# Patient Record
(demographics unavailable — no encounter records)

---

## 2017-02-21 NOTE — MM
Reason for exam: screening  (asymptomatic).

Last mammogram was performed 1 year and 4 months ago.



History:

Family history of premenopausal breast cancer in maternal aunt at age 34.



Physical Findings:

A clinical breast exam by your physician is recommended on an annual basis and 

results should be correlated with mammographic findings.



MG Screening Mammo w CAD

Bilateral CC, MLO, and XCCL view(s) were taken.

Prior study comparison: October 19, 2015, bilateral MG screening mammo w CAD.  

December 9, 2013, bilateral digital screening mammo w/CAD.  May 29, 2012, 

bilateral digital screening mammo w/CAD.

There are scattered fibroglandular densities.  No significant changes when 

compared with prior studies.





ASSESSMENT: Negative, BI-RAD 1



RECOMMENDATION:

Routine screening mammogram of both breasts in 1 year.

## 2017-03-23 NOTE — XR
EXAMINATION TYPE: XR chest 2V

 

DATE OF EXAM: 3/22/2017 10:22 AM

 

COMPARISON: NONE

 

HISTORY: Shortness of breath and chest pain

 

TECHNIQUE:  Frontal and lateral views of the chest are obtained.

 

FINDINGS:  Patient is rotated. Accentuation of the heart is likely due to technique. Increased AP deepak
meter chest may be indicative of underlying COPD. There is no evident pneumonia, pneumothorax, or ple
ural effusion. Pulmonary vascularity and tonja are within normal.

 

IMPRESSION:  No acute cardiopulmonary process.

## 2017-04-18 NOTE — CT
EXAMINATION TYPE: CT angio chest

 

DATE OF EXAM: 4/18/2017 8:33 PM

 

COMPARISON: NONE

 

HISTORY: Increased SOB x2 weeks.

 

CT DLP: 890.4 mGycm

Automated exposure control for dose reduction was used.

 

CONTRAST: 

CTA scan of the thorax is performed with IV Contrast, patient injected with 90 mL of Omnipaque 350, p
ulmonary embolism protocol.  There are 3-D post processed images..  

 

FINDINGS:

 

Exam is limited by patient size. There is mild focal consolidation and atelectasis in the right upper
 lobe posterior segment along the major fissure. There is no sign of aortic aneurysm or dissection. T
here is suboptimal contrast density in the lower lobe pulmonary arteries. I'm suspicious for multiple
 filling defects in the lower lobe pulmonary arteries. There is no mediastinal adenopathy. There are 
no hilar masses. The bony thorax is intact.

 

IMPRESSION: 

CARDIOMEGALY.

 

LIMITED EXAM. WITHIN THE LIMITS OF THE EXAM THERE IS EVIDENCE FOR MULTIPLE SMALL EMBOLI IN THE LOWER 
LOBE PULMONARY ARTERIES AND PROBABLY ALSO THE RIGHT UPPER LOBE PULMONARY ARTERY. THERE IS INFILTRATE 
IN THE POSTERIOR SEGMENT RIGHT UPPER LOBE THAT COULD BE A PULMONARY INFARCT.

 

FINDINGS WERE GIVEN VERBALLY TO DR. MCINTYRE, 8:40 PM.

## 2017-04-18 NOTE — XR
EXAMINATION TYPE: XR chest 2V

 

DATE OF EXAM: 4/18/2017 7:13 PM

 

COMPARISON: 3/22/2017

 

HISTORY: Difficulty breathing

 

TECHNIQUE:  Frontal and lateral views of the chest are obtained.

 

FINDINGS:  The heart and mediastinum are normal. Lungs are clear. Costophrenic angles are clear. Ther
e are no hilar masses. There are chest leads.

 

IMPRESSION:  No active cardiopulmonary disease. No change.

## 2017-04-18 NOTE — ED
General Adult HPI





- General


Chief complaint: Shortness of Breath


Stated complaint: Difficulty Breathing


Time Seen by Provider: 17 17:19


Source: patient, RN notes reviewed, old records reviewed


Mode of arrival: wheelchair


Limitations: no limitations





- History of Present Illness


Initial comments: 





This is a 45-year-old female here for evaluation of shortness of breath severe 

shortness of breath and exertional dyspnea.  Patient has a history of morbid 

obesity.  No travel history no squeal specific chest pain just exertional 

shortness of breath.  Patient states just by standing up she gets significantly 

short of breath.  This is been an issue on and off really getting progressively 

worse for a few weeks now.  Patient has had low pulse ox and testing at her 

family doctor's office and has been going through outpatient treatment with 

both cardiology and her family doctor to figure out cause of shortness of 

breath.  Patient denies fever cough or congestion





- Related Data


 Home Medications











 Medication  Instructions  Recorded  Confirmed


 


Atenolol/Chlorthalidone 1 tab PO DAILY 06/19/15 04/18/17





[Atenolol-Chlorthalidone 50-25]   


 


ALPRAZolam [Xanax] 0.5 mg PO BID PRN 17


 


Ergocalciferol [Vitamin D2] 50,000 unit PO TU 17


 


Fish Oil/Dha/Epa [Fish Oil 1,200 2 cap PO DAILY 17





mg Fish Oil]   


 


Levothyroxine Sodium [Synthroid] 75 mcg PO HS 17


 


Lisinopril [Zestril] 5 mg PO DAILY 17


 


Niacin (Inositol Niacinate) 1,200 mg PO DAILY 17





[Niacin Flush Free 500 mg Cap]   


 


glipiZIDE [Glucotrol XL] 10 mg PO BID 17











 Allergies











Allergy/AdvReac Type Severity Reaction Status Date / Time


 


Statins-Hmg-Coa Reductase AdvReac Severe Muscle Verified 17 18:34





Inhibitor   Cramps  














Review of Systems


ROS Statement: 


Those systems with pertinent positive or pertinent negative responses have been 

documented in the HPI.





ROS Other: All systems not noted in ROS Statement are negative.





Past Medical History


Past Medical History: Diabetes Mellitus, Hypertension, Thyroid Disorder


History of Any Multi-Drug Resistant Organisms: None Reported


Past Surgical History:  Section


Additional Past Surgical History / Comment(s): right ovary removed


Past Psychological History: Depression


Smoking Status: Current every day smoker


Past Alcohol Use History: None Reported


Past Drug Use History: None Reported





General Exam


Limitations: no limitations


General appearance: alert, in distress, obese


Head exam: Present: atraumatic, normocephalic, normal inspection


Eye exam: Present: normal appearance, PERRL, EOMI.  Absent: scleral icterus, 

conjunctival injection, periorbital swelling


ENT exam: Present: normal exam, mucous membranes moist


Neck exam: Present: normal inspection.  Absent: tenderness, meningismus, 

lymphadenopathy


Respiratory exam: Present: normal lung sounds bilaterally, decreased breath 

sounds, prolonged expiratory.  Absent: respiratory distress, wheezes, rales, 

rhonchi, stridor


Cardiovascular Exam: Present: regular rate, normal rhythm, normal heart sounds.

  Absent: systolic murmur, diastolic murmur, rubs, gallop, clicks


GI/Abdominal exam: Present: soft, normal bowel sounds.  Absent: distended, 

tenderness, guarding, rebound, rigid


Extremities exam: Present: normal inspection, full ROM, normal capillary 

refill.  Absent: tenderness, pedal edema, joint swelling, calf tenderness


Back exam: Present: normal inspection


Neurological exam: Present: alert, oriented X3, CN II-XII intact


Psychiatric exam: Present: normal affect, normal mood


Skin exam: Present: warm, dry, intact, normal color.  Absent: rash





Course


 Vital Signs











  17





  17:14 17:16 17:42


 


Temperature 97.5 F L  


 


Pulse Rate 80 82 


 


Respiratory 22 22 22





Rate   


 


Blood Pressure 111/60 108/60 


 


O2 Sat by Pulse 87 L 100 





Oximetry   














  17





  18:16 18:18 18:26


 


Temperature   


 


Pulse Rate 74 85 88


 


Respiratory 20  





Rate   


 


Blood Pressure 109/69  


 


O2 Sat by Pulse 92 L  





Oximetry   














- Reevaluation(s)


Reevaluation #1: 





17 20:44


Patient remains with significant exertional shortness of breath


Reevaluation #2: 





17 20:44


Spoke with radiology regarding patient





Medical Decision Making





- Medical Decision Making





45 female in the ER for reevaluation of chest pain source of breath exertional 

dyspnea.  Hypoxia.  Patient does have CT positive for PE.  Will be admitted





- Lab Data


Result diagrams: 


 17 18:45





 17 18:45


 Lab Results











  17 Range/Units





  18:45 18:45 18:45 


 


WBC  11.5 H    (3.8-10.6)  k/uL


 


RBC  5.16    (3.80-5.40)  m/uL


 


Hgb  16.0    (11.4-16.0)  gm/dL


 


Hct  51.4 H    (34.0-46.0)  %


 


MCV  99.6    (80.0-100.0)  fL


 


MCH  31.1    (25.0-35.0)  pg


 


MCHC  31.2    (31.0-37.0)  g/dL


 


RDW  15.2    (11.5-15.5)  %


 


Plt Count  222    (150-450)  k/uL


 


Neutrophils %  70    %


 


Lymphocytes %  23    %


 


Monocytes %  3    %


 


Eosinophils %  2    %


 


Basophils %  1    %


 


Neutrophils #  8.0 H    (1.3-7.7)  k/uL


 


Lymphocytes #  2.7    (1.0-4.8)  k/uL


 


Monocytes #  0.4    (0-1.0)  k/uL


 


Eosinophils #  0.2    (0-0.7)  k/uL


 


Basophils #  0.1    (0-0.2)  k/uL


 


Hypochromasia  Slight    


 


Macrocytosis  Slight    


 


PT   11.8   (9.0-12.0)  sec


 


INR   1.2   (<1.1)  


 


APTT   18.5 L   (22.0-30.0)  sec


 


D-Dimer   3.08 H   (<0.60)  mg/L FEU


 


Sodium    136 L  (137-145)  mmol/L


 


Potassium    4.7  (3.5-5.1)  mmol/L


 


Chloride    94 L  ()  mmol/L


 


Carbon Dioxide    31 H  (22-30)  mmol/L


 


Anion Gap    11  mmol/L


 


BUN    20 H  (7-17)  mg/dL


 


Creatinine    0.91  (0.52-1.04)  mg/dL


 


Est GFR (MDRD) Af Amer    >60  (>60 ml/min/1.73 sqM)  


 


Est GFR (MDRD) Non-Af    >60  (>60 ml/min/1.73 sqM)  


 


Glucose    132 H  (74-99)  mg/dL


 


Calcium    9.0  (8.4-10.2)  mg/dL


 


Magnesium    2.0  (1.6-2.3)  mg/dL


 


Total Bilirubin    1.0  (0.2-1.3)  mg/dL


 


AST    43 H  (14-36)  U/L


 


ALT    42  (9-52)  U/L


 


Alkaline Phosphatase    48  ()  U/L


 


Total Creatine Kinase     ()  U/L


 


CK-MB (CK-2)     (0.0-2.4)  ng/mL


 


CK-MB (CK-2) Rel Index     


 


Troponin I     (0.000-0.034)  ng/mL


 


NT-Pro-B Natriuret Pep     pg/mL


 


Total Protein    7.3  (6.3-8.2)  g/dL


 


Albumin    3.7  (3.5-5.0)  g/dL














  17 Range/Units





  18:45 18:45 


 


WBC    (3.8-10.6)  k/uL


 


RBC    (3.80-5.40)  m/uL


 


Hgb    (11.4-16.0)  gm/dL


 


Hct    (34.0-46.0)  %


 


MCV    (80.0-100.0)  fL


 


MCH    (25.0-35.0)  pg


 


MCHC    (31.0-37.0)  g/dL


 


RDW    (11.5-15.5)  %


 


Plt Count    (150-450)  k/uL


 


Neutrophils %    %


 


Lymphocytes %    %


 


Monocytes %    %


 


Eosinophils %    %


 


Basophils %    %


 


Neutrophils #    (1.3-7.7)  k/uL


 


Lymphocytes #    (1.0-4.8)  k/uL


 


Monocytes #    (0-1.0)  k/uL


 


Eosinophils #    (0-0.7)  k/uL


 


Basophils #    (0-0.2)  k/uL


 


Hypochromasia    


 


Macrocytosis    


 


PT    (9.0-12.0)  sec


 


INR    (<1.1)  


 


APTT    (22.0-30.0)  sec


 


D-Dimer    (<0.60)  mg/L FEU


 


Sodium    (137-145)  mmol/L


 


Potassium    (3.5-5.1)  mmol/L


 


Chloride    ()  mmol/L


 


Carbon Dioxide    (22-30)  mmol/L


 


Anion Gap    mmol/L


 


BUN    (7-17)  mg/dL


 


Creatinine    (0.52-1.04)  mg/dL


 


Est GFR (MDRD) Af Amer    (>60 ml/min/1.73 sqM)  


 


Est GFR (MDRD) Non-Af    (>60 ml/min/1.73 sqM)  


 


Glucose    (74-99)  mg/dL


 


Calcium    (8.4-10.2)  mg/dL


 


Magnesium    (1.6-2.3)  mg/dL


 


Total Bilirubin    (0.2-1.3)  mg/dL


 


AST    (14-36)  U/L


 


ALT    (9-52)  U/L


 


Alkaline Phosphatase    ()  U/L


 


Total Creatine Kinase  49   ()  U/L


 


CK-MB (CK-2)  0.8   (0.0-2.4)  ng/mL


 


CK-MB (CK-2) Rel Index  1.6   


 


Troponin I  0.102 H*   (0.000-0.034)  ng/mL


 


NT-Pro-B Natriuret Pep   4010  pg/mL


 


Total Protein    (6.3-8.2)  g/dL


 


Albumin    (3.5-5.0)  g/dL














- Radiology Data


Radiology results: report reviewed (Chest x-ray negative CT positive for PE), 

image reviewed





Critical Care Time


Critical Care Time: Yes


Total Critical Care Time: 31





Disposition


Clinical Impression: 


 Pulmonary embolism, Hypoxia





Disposition: ADMITTED AS IP TO THIS [facility]


Condition: Serious


Referrals: 


Que Mcdermott DO [Primary Care Provider] - 1-2 days

## 2017-04-19 NOTE — P.CNPUL
History of Present Illness


Consult date: 17


Reason for consult: dyspnea, hypoxemia, pulmonary embolism, abnormal CXR/CT


Chief complaint: Shortness of breath


History of present illness: 





45-year-old very obese female with history of hypertension and diabetes who 

presents with about 3 weeks with a progressive and increasing shortness of 

breath.  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  Anyway the breathing got so bad that she ended up coming into the 

emergency room to be evaluated and was found to have small pulmonary emboli in 

the right lower lobe pulmonary artery.  Possibly also some small emboli in the 

right upper lobe pulmonary artery.  Anyway, she was admitted and placed on IV 

heparin.  She's feeling a bit better today.  The patient does smoke cigarettes.

  She does have a history of hypertension and diabetes.  Her symptoms have been 

progressive.  We talked a little bit about pulmonary embolism today.  I told 

because her clot was probably non-provoked even though she is a bit sedentary, 

she should probably be treated for 6 months.





Review of Systems





A 12 point review of system is positive for progressive and severe shortness of 

breath.  Other than that she really doesn't have much in the way of complaints.





Past Medical History


Past Medical History: Diabetes Mellitus, Hypertension, Pulmonary Embolus (PE), 

Thyroid Disorder


History of Any Multi-Drug Resistant Organisms: None Reported


Past Surgical History:  Section


Additional Past Surgical History / Comment(s): right ovary removed


Past Anesthesia/Blood Transfusion Reactions: No Reported Reaction


Past Psychological History: Depression


Smoking Status: Current every day smoker


Past Alcohol Use History: None Reported


Past Drug Use History: None Reported





- Past Family History


  ** Father


Family Medical History: No Reported History





  ** Mother


Family Medical History: No Reported History





Medications and Allergies


 Home Medications











 Medication  Instructions  Recorded  Confirmed  Type


 


Atenolol/Chlorthalidone 1 tab PO DAILY 06/19/15 04/19/17 History





[Atenolol-Chlorthalidone 50-25]    


 


ALPRAZolam [Xanax] 0.5 mg PO BID PRN 17 History


 


Ergocalciferol [Vitamin D2] 50,000 unit PO TU 17 History


 


Fish Oil/Dha/Epa [Fish Oil 1,200 2 cap PO DAILY 17 History





mg Fish Oil]    


 


Levothyroxine Sodium [Synthroid] 75 mcg PO HS 17 History


 


Lisinopril [Zestril] 5 mg PO DAILY 17 History


 


Niacin (Inositol Niacinate) 1,200 mg PO DAILY 17 History





[Niacin Flush Free 500 mg Cap]    


 


glipiZIDE [Glucotrol XL] 10 mg PO BID 17 History











 Allergies











Allergy/AdvReac Type Severity Reaction Status Date / Time


 


Statins-Hmg-Coa Reductase AdvReac Severe Muscle Verified 17 18:34





Inhibitor   Cramps  














Physical Exam


Osteopathic Statement: *.  No significant issues noted on an osteopathic 

structural exam other than those noted in the History and Physical/Consult.


Vitals: 


 Vital Signs











  Temp Pulse Pulse Resp BP BP Pulse Ox


 


 17 08:00  96.8 F L   60  18   116/68  94 L


 


 17 04:00  97.3 F L   77  19   104/52  95


 


 17 00:34  98.8 F  93   24  127/62   92 L


 


 17 23:00   98   24    89 L


 


 17 21:52  98.4 F  90   22  121/71   90 L


 


 17 20:50   88   22  128/63   89 L








 Intake and Output











 17





 06:59 14:59 22:59


 


Intake Total 1138.396 373.003 


 


Balance 1138.396 373.003 


 


Intake:   


 


    


 


    Heparin Sodium,Porcine/ 450  





    D5w Pmx 25,000 unit In   





    Dextrose/Water 1 500ml.   





    bag @ 10.5 UNITS/KG/HR 45   





    .91 mls/hr IV .[id number]   





    LAKESHA Rx#:[id number]   


 


    Sodium Chloride 0.9% 1, 200  





    000 ml @ 20 mls/hr IV .   





    Q24H STA Rx#:[id number]   


 


  Intake, IV Titration 488.396 373.003 





  Amount   


 


    Heparin Sodium,Porcine/ 488.396 373.003 





    D5w Pmx 25,000 unit In   





    Dextrose/Water 1 500ml.   





    bag @ 10.5 UNITS/KG/HR 45   





    .91 mls/hr IV .[id number]   





    LAKESHA Rx#:[id number]   


 


Other:   


 


  Voiding Method Bedside Commode  


 


  # Voids 1 0 


 


  # Bowel Movements 1 0 


 


  Weight 218.9 kg  














No acute distress, oriented 3.  Sitting at the bedside.  Very obese female.  

Difficult to examine because of body habitus.





HEENT examination is grossly unremarkable.





Neck supple.  Full range of motion.  No adenopathy.





Cardiovascular examination reveals distant heart sounds.  S1-S2 normal.





Lungs reveal mostly clear breath sounds.  No wheezes rhonchi or crackles.





Abdomen obese.  Bowel sounds are heard.





Extremities are intact.  Mild edema.





Skin without rash or lesion.











Results





- Laboratory Findings


CBC and BMP: 


 17 06:52





 17 18:45


PT/INR, D-dimer











PT  11.8 sec (9.0-12.0)   17  18:45    


 


INR  1.2  (<1.1)   17  18:45    


 


D-Dimer  3.08 mg/L FEU (<0.60)  H  17  18:45    








Abnormal lab findings: 


 Abnormal Labs











  17





  01:17 02:50 06:07


 


WBC   


 


Hct   


 


Neutrophils # (Manual)   


 


APTT   37.3 H 


 


POC Glucose (mg/dL)    234 H


 


Hemoglobin A1c   


 


Troponin I  0.063 H*  


 


Triglycerides   


 


LDL Cholesterol, Calc   


 


HDL Cholesterol   














  17





  06:52 06:52 06:52


 


WBC  12.2 H  


 


Hct  48.3 H  


 


Neutrophils # (Manual)  9.5 H  


 


APTT   


 


POC Glucose (mg/dL)   


 


Hemoglobin A1c   


 


Troponin I   0.062 H* 


 


Triglycerides    177 H


 


LDL Cholesterol, Calc    118 H


 


HDL Cholesterol    19 L














  17





  06:52 08:58 11:58


 


WBC   


 


Hct   


 


Neutrophils # (Manual)   


 


APTT   64.2 H 


 


POC Glucose (mg/dL)    154 H


 


Hemoglobin A1c  7.5 H  


 


Troponin I   


 


Triglycerides   


 


LDL Cholesterol, Calc   


 


HDL Cholesterol   














- Diagnostic Findings


Chest x-ray: image reviewed


CT scan - chest: image reviewed (Chest x-rays labs and medications are all 

reviewed.)





Assessment and Plan


(1) COPD (chronic obstructive pulmonary disease)


Status: Acute   





(2) Diabetes mellitus


Status: Acute   





(3) Hypertension


Status: Acute   





(4) Hypoxia


Status: Acute   





(5) Obesity


Status: Acute   





(6) Pulmonary embolism


Status: Acute   


Plan: 





Plan dated 2017





The patient should be treated for 6 months with blood thinners.  Hopefully she'

ll qualify for one of the factor X a inhibitors.  Currently she is on IV 

heparin.  She needs a repeat computed tomography scan in about 8-10 weeks.  I 

asked her to come see me in the office.  No we'll clear-cut definable risk 

factor other than the fact that she is somewhat sedentary although she 

continues to work.  The patient is a smoker though.  Does not take any estrogen 

replacement therapy.  Has not been on any long car train boat or plane rides.  

No trauma to the lower extremities.  Doppler of the lower semis were negative.  

In the office, she'll have complete PFTs.  Again we'll repeat a computed 

tomography scan in about 8-10 weeks.


Time with Patient: Greater than 30

## 2017-04-19 NOTE — US
EXAMINATION TYPE: US venous doppler duplex LE 

 

DATE OF EXAM: 4/19/2017 7:54 AM

 

COMPARISON: NONE

 

CLINICAL History: pain, swelling, possible PE.

 

SIDE PERFORMED: Bilateral  

 

TECHNIQUE:  The lower extremity deep venous system is examined utilizing real time linear array sonog
aleyda with graded compression, doppler sonography and color-flow sonography.

 

VESSELS IMAGED:

External Iliac Vein (EIV)

Common Femoral Vein

Deep Femoral Vein

Greater Saphenous Vein *

Femoral Vein-unable to visualized mid and distal for compression views

Popliteal Vein

Proximal Calf Veins-not seen

(* superficial vessels)

 

Patient 482lbs, exam technically difficult and somewhat limited

 

Right Leg:  Appears negative for DVT in this somewhat limited study

 

Left Leg:   Appears negative for DVT in this somewhat limited study

 

 

 

IMPRESSION: No evident deep venous thrombosis bilaterally within the deep veins as described, limited
 exam

## 2017-04-19 NOTE — P.CRDCN
History of Present Illness


Consult date: 17


History of present illness: 


This is a 45-year-old female with history of diabetes and obesity who was seen 

by Dr. Liao for evaluation of symptoms of shortness of breath about 2 weeks 

ago.  Patient had an echocardiogram which showed normal LV function at the time 

and also normal right ventricular size.  Over the last 3-4 days, patient has 

been experiencing increasing shortness of breath and orthopnea.  Patient came 

to the emergency room with those symptoms.  Chest x-ray did not reveal any 

findings of CHF.  Blood test showed evidence of elevated d-dimer.  Computed 

tomography scan showed evidence of multiple pulmonary emboli.  Her troponins 

are also mildly elevated.  BNP is also elevated.  EKG showed sinus rhythm with 

a diffuse ST-T wave normalities in anterolateral leads suggestive of ischemia.  

Patient is currently on heparin.  Patient's venous duplex study of the lower 

legs was negative for any DVT.  Patient does have some rash and cellulitis-like 

changes in the left upper arm.  We are going to get a venous duplex study of 

the arm to rule out DVT in the left upper arm.  We will going to repeat the 

echocardiogram to rule out any wall motion abnormalities because of abnormal 

EKG and troponin values.  We may empirically treat her with nitrates and beta 

blockers along with aspirin.  Further recommendations depend upon clinical 

course.








Review of Systems





REVIEW OF SYSTEMS:


CONSTITUTIONAL:.  Patient is doing well. No complaints of fever or chills


EYES: Denies diplopia, blurring of vision


EARS, NOSE, MOUTH, THROAT: Denies headaches, denies sore throat.


CARDIOVASCULAR: As per HPI


RESPIRATORY: As per HPI


GASTROINTESTINAL: Denies change in appetite, denies abdominal pain, denies 

diarrhea


GENITOURINARY: Denies hematuria, denies infections.


MUSKULOSKELETAL: Denies pain, denies swelling.  Denies any cramps or 

claudication


INTEGUMENTARY: Denies rash, denies eczema.


NEUROLOGICAL:  Denies  focal weakness, or visual disturbance.  Denies any 

dizziness or syncope


PSYCHIATRIC: Denies anxiety, denies depression.


HEMATOLOGIC/LYMPHATIC: Denies any bleeding, denies enlarged lymph nodes.








Past Medical History


Past Medical History: Diabetes Mellitus, Hypertension, Pulmonary Embolus (PE), 

Thyroid Disorder


History of Any Multi-Drug Resistant Organisms: None Reported


Past Surgical History:  Section


Additional Past Surgical History / Comment(s): right ovary removed


Past Anesthesia/Blood Transfusion Reactions: No Reported Reaction


Past Psychological History: Depression


Smoking Status: Current every day smoker


Past Alcohol Use History: None Reported


Past Drug Use History: None Reported





- Past Family History


  ** Father


Family Medical History: No Reported History





  ** Mother


Family Medical History: No Reported History





Medications and Allergies


 Home Medications











 Medication  Instructions  Recorded  Confirmed  Type


 


Atenolol/Chlorthalidone 1 tab PO DAILY 06/19/15 04/19/17 History





[Atenolol-Chlorthalidone 50-25]    


 


ALPRAZolam [Xanax] 0.5 mg PO BID PRN 17 History


 


Ergocalciferol [Vitamin D2] 50,000 unit PO TU 17 History


 


Fish Oil/Dha/Epa [Fish Oil 1,200 2 cap PO DAILY 17 History





mg Fish Oil]    


 


Levothyroxine Sodium [Synthroid] 75 mcg PO HS 17 History


 


Lisinopril [Zestril] 5 mg PO DAILY 17 History


 


Niacin (Inositol Niacinate) 1,200 mg PO DAILY 17 History





[Niacin Flush Free 500 mg Cap]    


 


glipiZIDE [Glucotrol XL] 10 mg PO BID 17 History











 Allergies











Allergy/AdvReac Type Severity Reaction Status Date / Time


 


Statins-Hmg-Coa Reductase AdvReac Severe Muscle Verified 17 18:34





Inhibitor   Cramps  














Physical Exam


Vitals: 


 Vital Signs











  Temp Pulse Pulse Resp BP BP Pulse Ox


 


 17 08:00  96.8 F L   60  18   116/68  94 L


 


 17 04:00  97.3 F L   77  19   104/52  95


 


 17 00:34  98.8 F  93   24  127/62   92 L


 


 17 23:00   98   24    89 L


 


 17 21:52  98.4 F  90   22  121/71   90 L


 


 17 20:50   88   22  128/63   89 L








 Intake and Output











 17





 22:59 06:59 14:59


 


Intake Total  1138.396 


 


Balance  1138.396 


 


Intake:   


 


  IV  650 


 


    Heparin Sodium,Porcine/  450 





    D5w Pmx 25,000 unit In   





    Dextrose/Water 1 500ml.   





    bag @ 10.5 UNITS/KG/HR 45   





    .91 mls/hr IV .[id number]   





    [facility] Rx#:[id number]   


 


    Sodium Chloride 0.9% 1,  200 





    000 ml @ 20 mls/hr IV .   





    Q24H [facility] Rx#:[id number]   


 


  Intake, IV Titration  488.396 





  Amount   


 


    Heparin Sodium,Porcine/  488.396 





    D5w Pmx 25,000 unit In   





    Dextrose/Water 1 500ml.   





    bag @ 10.5 UNITS/KG/HR 45   





    .91 mls/hr IV .[id number]   





    [facility] Rx#:[id number]   


 


Other:   


 


  Voiding Method  Bedside Commode 


 


  # Voids  1 0


 


  # Bowel Movements  1 0


 


  Weight  218.9 kg 














GENERAL EXAM: Patient is alert and oriented and appears to be in moderate 

distress.


HEENT: Normocephalic. Normal reaction of pupils, equal size, normal range of 

extraocular motion. No erythema or exudates in the throat.


NECK: No masses, no nuchal rigidity.


CHEST: No chest wall deformity.


LUNGS: Diminished air entry with scattered rhonchi


HEART: Distant heart sounds


ABDOMEN: No hepatosplenomegaly, normal bowel sounds, no guarding or rigidity.


SKIN: No rashes


CENTRAL NERVOUS SYSTEM: No focal deficits.


EXTREMITIES: No cyanosis, clubbing or edema.  There is a rash over the left 

shoulder area.





Results





 17 06:52





 17 18:45


 Cardiac Enzymes











  17 Range/Units





  01:17 06:52 


 


CK-MB (CK-2)  0.6  0.8  (0.0-2.4)  ng/mL


 


Troponin I  0.063 H*  0.062 H*  (0.000-0.034)  ng/mL








 Coagulation











  17 Range/Units





  02:50 08:58 


 


APTT  37.3 H  64.2 H  (22.0-30.0)  sec








 Lipids











  17 Range/Units





  06:52 


 


Triglycerides  177 H  (<150)  mg/dL


 


Cholesterol  172  (<200)  mg/dL


 


HDL Cholesterol  19 L  (40-60)  mg/dL








 CBC











  17 Range/Units





  06:52 


 


WBC  12.2 H  (3.8-10.6)  k/uL


 


RBC  4.86  (3.80-5.40)  m/uL


 


Hgb  15.4  (11.4-16.0)  gm/dL


 


Hct  48.3 H  (34.0-46.0)  %


 


Plt Count  243  (150-450)  k/uL








 Current Medications











Generic Name Dose Route Start Last Admin





  Trade Name Freq  PRN Reason Stop Dose Admin


 


Albuterol/Ipratropium  3 ml  17 12:00  





  Duoneb 0.5 Mg-3 Mg/3 Ml Soln  INHALATION   





  RT-QID [facility]   


 


Albuterol/Ipratropium  3 ml  17 10:49  





  Duoneb 0.5 Mg-3 Mg/3 Ml Soln  INHALATION   





  RT-Q2H PRN   





  Shortness Of Breath Or Wheezing   


 


Alprazolam  0.5 mg  17 10:48  





  Xanax  PO   





  BID PRN   





  Anxiety   


 


Aspirin  325 mg  17 09:00  





  Aspirin  PO   





  DAILY [facility]   


 


Atenolol  50 mg  17 11:00  





  Tenormin  PO   





  DAILY [facility]   


 


Chlorthalidone  25 mg  17 11:00  





  Hygroton  PO   





  DAILY [facility]   


 


Ergocalciferol  50,000 unit  17 12:00  





  Vitamin D2  PO   





  TU [facility]   


 


Glipizide  10 mg  17 11:30  





  Glucotrol  PO   





  BID [facility]   


 


Heparin Sodium (Porcine)  0 unit  17 20:40  17 04:29





  Heparin  IV   10,000 unit





  PER PROTOCOL PRN   Administration





  Low PTT   





  Protocol   


 


Sodium Chloride  1,000 mls @ 20 mls/hr  17 18:02  17 18:26





  Saline 0.9%  IV  17 18:01  20 mls/hr





  .Q24H [facility]   Administration


 


Heparin Sodium/Dextrose 25,000  500 mls @ 45.91 mls/hr  17 20:45   06:41





  unit/ IV Solution  IV   14.5 units/kg/hr





  .[id number] [facility]   63.4 mls/hr





  Protocol   Administration





  10.5 UNITS/KG/HR   


 


Insulin Human Lispro  0 unit  17 07:30  17 06:57





  Humalog  SQ   8 unit





  [facility]   Administration





  Protocol   


 


Levothyroxine Sodium  75 mcg  17 21:00  





  Synthroid  PO   





  HS [facility]   


 


Lisinopril  5 mg  17 11:30  





  Zestril  PO   





  DAILY [facility]   


 


Miscellaneous Information  1 each  17 18:54  





  Rx Info: Iv Contrast Was Given  MISCELLANE  17 18:54  





  DAILY PRN   





  Per Protocol   


 


Niacin  1,000 mg  17 11:00  





  Niacin Tr  PO   





  DAILY [facility]   


 


Nicotine  1 patch  17 11:30  





  Habitrol 21mg/24hr Patch  TRANSDERM   





  DAILY [facility]   


 


Nitroglycerin  0.4 mg  17 20:40  





  Nitrostat  SUBLINGUAL   





  Q5M PRN   





  Chest Pain   


 


Pantoprazole Sodium  40 mg  17 11:30  





  Protonix  IVP   





  DAILY [facility]   








 Intake and Output











 17





 22:59 06:59 14:59


 


Intake Total  1138.396 


 


Balance  1138.396 


 


Intake:   


 


  IV  650 


 


    Heparin Sodium,Porcine/  450 





    D5w Pmx 25,000 unit In   





    Dextrose/Water 1 500ml.   





    bag @ 10.5 UNITS/KG/HR 45   





    .91 mls/hr IV .[id number]   





    [facility] Rx#:[id number]   


 


    Sodium Chloride 0.9% 1,  200 





    000 ml @ 20 mls/hr IV .   





    Q24H [facility] Rx#:[id number]   


 


  Intake, IV Titration  488.396 





  Amount   


 


    Heparin Sodium,Porcine/  488.396 





    D5w Pmx 25,000 unit In   





    Dextrose/Water 1 500ml.   





    bag @ 10.5 UNITS/KG/HR 45   





    .91 mls/hr IV .[id number]   





    [facility] Rx#:[id number]   


 


Other:   


 


  Voiding Method  Bedside Commode 


 


  # Voids  1 0


 


  # Bowel Movements  1 0


 


  Weight  218.9 kg 








 





 17 06:52 











EKG Interpretations (text)





Sinus rhythm with ST-T changes in anterolateral leads suggestive of ischemia.  

Could be related to pulmonary emboli





Assessment and Plan


(1) Abnormal EKG


Status: Acute   





(2) Hypoxia


Status: Acute   





(3) Pulmonary embolism


Status: Acute   





(4) Diabetes mellitus


Status: Acute   





(5) Obesity


Status: Acute   





(6) Hypertension


Status: Acute   


Plan: 


Continue with heparin.  Repeat echocardiogram to assess for wall motion 

abnormality and ultrasound of the left arm to rule out deep venous thrombosis.  

Continue with nitrates and diuretics and also ACE inhibitor along with beta 

blocker.  If echo cardiogram shows any segmental wall motion defects, patient 

may need cardiac catheterization.

## 2017-04-19 NOTE — US
EXAMINATION TYPE: US venous doppler duplex UE LT

 

DATE OF EXAM: 4/19/2017 3:46 PM

 

COMPARISON: NONE

 

CLINICAL HISTORY: Redness.

 

SIDE PERFORMED: left

 

482lb patient, exam technically difficult and somewhat limited.

 

 

 

Left Arm: Appears negative for DVT in this somewhat limited exam.

 

Grayscale, color Doppler, spectral Doppler imaging performed of the deep veins of the upper extremity
. The left internal jugular vein is patent, there is normal color flow and compressibility, normal va
scular waveforms, visualized portions of the subclavian vein unremarkable, axillary vein shows color 
flow. The basilic vein shows compressibility and color flow analysis is cephalic vein. Brachial veins
 are patent. Radial veins are patent. Ulnar veins are patent.

 

IMPRESSION: 

No deep venous thrombosis is evident within the limitations of the exam.

## 2017-04-19 NOTE — HP
DATE OF ADMISSION:  2017



DATE OF SERVICE: 2017 



CHIEF COMPLAINT: Shortness of breath. 



HISTORY OF PRESENT ILLNESS: This 45-year-old woman with a past medical 

history of diabetes mellitus, hypertension, history of pulmonary 

embolism, history of hypothyroidism, history of depression, history of 

nicotine dependence, being followed by Dr. Mcdermott in the outpatient 

setting, was complaining of shortness of breath for the past several 

weeks. Because of lack of improvement, the patient came to [facility] and was admitted for further evaluation and treatment. The 

shortness of breath was progressing, but on  it got worse 

suddenly, according to her. There is no history of chest pain, no 

palpitation. Patient came to [facility] Emergency Room. 

D-dimer was elevated. Patient  had a CT scan of the chest, which I 

reviewed personally. It was reported as showing evidence of multiple 

small emboli in the lower lobe, pulmonary arteries and probably in the 

right upper lobe, also. Some infiltrate was suspected, and patient was 

admitted for further evaluation and treatment. EKG showed ST-T changes 

and cardiology evaluation is in progress. Troponins are indeterminate, 

up to 0.062. There is no history of any fever, rigor, or chills.  No 

history of any headache, loss of consciousness, seizures at this time. 

 



PAST MEDICAL HISTORY: 

1. History of diabetes mellitus. 

2. Hypertension. 

3. History of hypothyroidism. 

4.  section. 

5. History of depression. 



HOME MEDICATIONS: 

1. Glucotrol XL 10 mg p.o. b.i.d. 

2. Niacin 1200 mg p.o. daily. 

3. Zestril 5 mg p.o. daily. 

4. Synthroid 75 mcg p.o. daily. 

5. Fish oil 2 capsules daily. 

6. Vitamin D2 50,000 p.o. Tuesday. 

7. Atenolol/chlorthalidone 50/25 one p.o. daily. 

8. Xanax 0.5 p.o. b.i.d. p.r.n. 



ALLERGIES: STATINS. 



FAMILY HISTORY: No history of any heart disease or strokes in the 

family.   



SOCIAL HISTORY: History of smoking on a daily basis. No history of 

alcohol intake.  



REVIEW OF SYSTEMS: 

ENT: No diminished hearing.  No diminished vision. 

CARDIOVASCULAR SYSTEM: As mentioned earlier. 

RESPIRATORY SYSTEM: As mentioned earlier. 

GI: No nausea. 

: No dysuria. 

NERVOUS SYSTEM: As mentioned earlier. 

ALLERGY/IMMUNOLOGY: No asthma, hayfever. 

MUSCULOSKELETAL: As mentioned earlier. 

HEMATOLOGY/ONCOLOGY: No history of anemia. 

ENDOCRINE: Diabetes mellitus and hypothyroidism. 

CONSTITUTIONAL:  As mentioned earlier. 

DERMATOLOGY: Negative. 

RHEUMATOLOGY: Negative. 

PSYCHIATRY: As mentioned earlier. 



PHYSICAL EXAMINATION: Patient is alert and oriented x3. Pulse 60, 

blood pressure 116/68, respiration 18, temperature 96.8, pulse ox 94% 

on 4 L.  

HEENT: Conjunctivae normal. Oral mucosa moist. 

NECK: Obese. No jugular venous distention. No carotid bruit. No lymph 

node enlargement. 

CARDIOVASCULAR SYSTEM: S1, S2 muffled. No S3. No S4.  

RESPIRATORY SYSTEM: Breath sounds diminished at the bases. A few 

scattered rhonchi heard. No crackles. No (      ) present.  

ABDOMEN: Soft, obese, nontender. No mass palpable. 

LEGS: Minimal edema bilaterally. 

NERVOUS SYSTEM: Higher functions as mentioned earlier. Moves all 4 

limbs. No focal motor or sensory deficit.  

LYMPHATICS:  No lymph node palpable in neck, axillae or groin.   

SKIN: As mentioned earlier. 

JOINTS: No active deforming arthropathy. 



LABS: WBC 12.2, hemoglobin 15.4. APTT 64.2. Troponin 0.062. 

Triglycerides 177. HDL is 19.  



ASSESSMENT: 

1. Shortness of breath with acute bilateral pulmonary embolism. 

2. Troponin 0.062, indeterminate. Rule out coronary artery disease or 

secondary to pulmonary embolism.  

3. Increased white count, possibly reactive. 

4. Increased hematocrit. 

5. History of nicotine dependence. 

6. D-dimer 3.08. 

7. Hyponatremia, mild. 

8. Diabetes mellitus, type 2. 

9. Hypothyroidism. 

10. Increased AST. 

11. Super morbid obesity; body mass index of 75.6. 

12. Hypertension, essential. 

13. History of hypothyroidism. 

14. History of  section. 

15. Right oophorectomy. 

16. Depression not otherwise specified. 

17. History of nicotine dependence, continued, ongoing. 

18. FULL CODE. 



RECOMMENDATIONS AND DISCUSSION: In this 45-year-old woman who 

presented with multiple complex medical issues, we will monitor the 

patient closely, continue the current medications, continue the 

symptomatic treatment. IV heparin. Full coagulation workup. Dr. Levy 

will be consulted for evaluation of the pulmonary status. Otherwise, 

Cardiology also consulted for abnormal EKG and indeterminate 

troponins. A 2-D echo with Doppler will been ordered. Monitor blood 

sugars closely. Continue with the rest of the medications. Symptomatic 

treatment. Resume the home medications. A copy of this dictation is 

being forwarded to Dr. Mcdermott, who is the primary physician.

## 2017-04-20 NOTE — P.PN
<Cassidy Ness - Last Filed: 04/20/17 16:54>





Subjective





Date of service 04/20/2017.





Personal being dictated for Dr. Corey.





Interval history: This a 45-year-old female admitted with acute COPD 

exacerbation, pulmonary embolism and multiple other medical issues.  Maintained 

on heparin drip.  Case management verifying patient will have outpatient 

prescription coverage for Xarelto.  Telemetry sinus rhythm to sinus tach, low 

100s.  Borderline hypotension. Denies chest pain, palpitations, or increased 

shortness of breath.





Objective





- Vital Signs


Vital signs: 


 Vital Signs











Temp  97.8 F   04/20/17 15:45


 


Pulse  76   04/20/17 15:45


 


Resp  18   04/20/17 16:00


 


BP  92/54   04/20/17 15:45


 


Pulse Ox  91 L  04/20/17 15:45








 Intake & Output











 04/19/17 04/20/17 04/20/17





 18:59 06:59 18:59


 


Intake Total 9125.282 3816 609.893


 


Output Total  350 


 


Balance 2085.709 1480 609.893


 


Weight  220.1 kg 220.1 kg


 


Intake:   


 


  IV  1040 


 


    Heparin Sodium,Porcine/  720 





    D5w Pmx 25,000 unit In   





    Dextrose/Water 1 500ml.   





    bag @ 10.5 UNITS/KG/HR 45   





    .91 mls/hr IV .[id number]   





    [facility] Rx#:[id number]   


 


    Sodium Chloride 0.9% 1,  320 





    000 ml @ 20 mls/hr IV .   





    Q24H [facility] Rx#:[id number]   


 


  Intake, IV Titration 1060.000 500 609.893





  Amount   


 


    Heparin Sodium,Porcine/ 500.000 500 609.893





    D5w Pmx 25,000 unit In   





    Dextrose/Water 1 500ml.   





    bag @ 10.5 UNITS/KG/HR 45   





    .91 mls/hr IV .[id number]   





    [facility] Rx#:[id number]   


 


    Heparin Sodium,Porcine/ 400  





    D5w Pmx 25,000 unit In   





    Dextrose/Water 1 500ml.   





    bag @ 12 UNITS/KG/HR 52.   





    47 mls/hr IV .Q9H32M [facility]   





    Rx#:[id number]   


 


    Sodium Chloride 0.9% 1, 160  





    000 ml @ 20 mls/hr IV .   





    Q24H [facility] Rx#:[id number]   


 


Output:   


 


  Urine  350 


 


Other:   


 


  Voiding Method  Bedside Commode Bedside Commode


 


  # Voids 0  1


 


  # Bowel Movements 0  














- Exam


PHYSICAL EXAM:


VITAL SIGNS: As above


GENERAL: [Sitting up at side of bed, no acute distress]


HEENT: [Pupils equal conjunctiva normal.  Oral mucosa moist]


NECK: [Supple, no JVD]


RESPIRATORY EFFORT:[Normal]


LUNGS:  [Essentially clear, bilateral bases diminished, no wheezes rhonchi or 

crackles]


CARDIOVASCULAR[regular S1 and S2, no murmur rub or gallop, positive edema]


GI: [Abdomen soft, obese, nontender, positive bowel sounds.]


PSYCH: [Alert and oriented -3, mood and affect normal.]


NEURO: No focal deficits, moves all 4 extremities, strength and sensation 

grossly intact











- Labs


CBC & Chem 7: 


 04/20/17 05:42





 04/20/17 05:42


Labs: 


 Abnormal Lab Results - Last 24 Hours (Table)











  04/19/17 04/19/17 04/20/17 Range/Units





  16:59 20:58 05:42 


 


APTT    74.0 H  (22.0-30.0)  sec


 


Sodium     (137-145)  mmol/L


 


BUN     (7-17)  mg/dL


 


Glucose     (74-99)  mg/dL


 


POC Glucose (mg/dL)  128 H  146 H   (75-99)  mg/dL














  04/20/17 04/20/17 04/20/17 Range/Units





  05:42 05:50 11:56 


 


APTT     (22.0-30.0)  sec


 


Sodium  136 L    (137-145)  mmol/L


 


BUN  20 H    (7-17)  mg/dL


 


Glucose  167 H    (74-99)  mg/dL


 


POC Glucose (mg/dL)   164 H  241 H  (75-99)  mg/dL














Assessment and Plan


Plan: 


1. [Shortness of breath with acute bilateral pulmonary embolism].


2. [Acute hypoxic respiratory failure secondary to the above].


3. [Troponin 0.062, indeterminate, rule out CAD ,possibly related to PE].


4. [Leukocytosis, possibly reactive].


5. [Ongoing nicotine dependence].


6.  Acute COPD exacerbation


7. [Diabetes mellitus type 2].


8.  Hypothyroidism


9.  Elevated AST


10.  Morbid obesity, BMI 76


11.  Essential hypertension


12.  Depression, not otherwise specified


13.  Heparin monitoring





Plan: Continue on current medication regime ,monitoring and symptomatic 

treatment.  Maintain heparin drip overnight.  Case management verifying Xarelto 

RX coverage for discharge.





<Alex Corey - Last Filed: 04/20/17 17:03>





Subjective


Principal diagnosis: 


Acute hypoxic repsiratory failure








Objective





- Vital Signs


Vital signs: 


 Vital Signs











Temp  97.8 F   04/20/17 15:45


 


Pulse  82   04/20/17 16:34


 


Resp  18   04/20/17 16:00


 


BP  92/54   04/20/17 15:45


 


Pulse Ox  91 L  04/20/17 15:45








 Intake & Output











 04/19/17 04/20/17 04/20/17





 18:59 06:59 18:59


 


Intake Total 4866.222 5803 609.893


 


Output Total  350 


 


Balance 4975.633 4157 609.893


 


Weight  220.1 kg 220.1 kg


 


Intake:   


 


  IV  1040 


 


    Heparin Sodium,Porcine/  720 





    D5w Pmx 25,000 unit In   





    Dextrose/Water 1 500ml.   





    bag @ 10.5 UNITS/KG/HR 45   





    .91 mls/hr IV .[id number]   





    [facility] Rx#:[id number]   


 


    Sodium Chloride 0.9% 1,  320 





    000 ml @ 20 mls/hr IV .   





    Q24H [facility] Rx#:[id number]   


 


  Intake, IV Titration 1060.000 500 609.893





  Amount   


 


    Heparin Sodium,Porcine/ 500.000 500 609.893





    D5w Pmx 25,000 unit In   





    Dextrose/Water 1 500ml.   





    bag @ 10.5 UNITS/KG/HR 45   





    .91 mls/hr IV .[id number]   





    [facility] Rx#:[id number]   


 


    Heparin Sodium,Porcine/ 400  





    D5w Pmx 25,000 unit In   





    Dextrose/Water 1 500ml.   





    bag @ 12 UNITS/KG/HR 52.   





    47 mls/hr IV .Q9H32M [facility]   





    Rx#:[id number]   


 


    Sodium Chloride 0.9% 1, 160  





    000 ml @ 20 mls/hr IV .   





    Q24[facility] Rx#:[id number]   


 


Output:   


 


  Urine  350 


 


Other:   


 


  Voiding Method  Bedside Commode Bedside Commode


 


  # Voids 0  1


 


  # Bowel Movements 0  














- Labs


CBC & Chem 7: 


 04/20/17 05:42





 04/20/17 05:42


Labs: 


 Abnormal Lab Results - Last 24 Hours (Table)











  04/19/17 04/19/17 04/20/17 Range/Units





  16:59 20:58 05:42 


 


APTT    74.0 H  (22.0-30.0)  sec


 


Sodium     (137-145)  mmol/L


 


BUN     (7-17)  mg/dL


 


Glucose     (74-99)  mg/dL


 


POC Glucose (mg/dL)  128 H  146 H   (75-99)  mg/dL














  04/20/17 04/20/17 04/20/17 Range/Units





  05:42 05:50 11:56 


 


APTT     (22.0-30.0)  sec


 


Sodium  136 L    (137-145)  mmol/L


 


BUN  20 H    (7-17)  mg/dL


 


Glucose  167 H    (74-99)  mg/dL


 


POC Glucose (mg/dL)   164 H  241 H  (75-99)  mg/dL














  04/20/17 Range/Units





  16:41 


 


APTT   (22.0-30.0)  sec


 


Sodium   (137-145)  mmol/L


 


BUN   (7-17)  mg/dL


 


Glucose   (74-99)  mg/dL


 


POC Glucose (mg/dL)  123 H  (75-99)  mg/dL

## 2017-04-20 NOTE — CDI
In responding to this query, please exercise your independent professional 
judgment. [facility] Coding Staff and Clinical Documentation Specialists 

appreciate your assistance in clarifying documentation, maintaining compliance 
with coding guidelines, accurately documenting patients condition and 
capturing severity of illness. The fact that a question is asked does not imply 
that any particular answer is desired or expected. Communication forms are a 
method of clarifying documentation and are not made part of the [facility] 
Record. Thank you in advance for your clarification.

 Last Revision, March 2016



Carie [place]

[address] [place] [address]



Documentation Clarification Form



Date: 4/20/2017 1:33:00 PM

From: Simone Upton RN, BSN, CDI

Phone: [phone]

MRN: [id number]

Admit Date: 4/18/2017 8:40:00 PM

Patient Name: Cristela Peña 

Visit Number: [id number]



Dr. Alex Corey:



The patient presented with the following respiratory symptoms: progressive SOB 
and exertional dyspnea.



"Hypoxia" is documented in the ED report, in the cardiology and pulmonary 
consultant notes.

"Shortness of breath" is documented in the H&P



History/Risk Factors:  44 yo female with a history of COPD and super morbid 
obesity, BMI: 76.0, currently being treated for acute b/l PE.

Tobacco use: "current every day smoker"

Home oxygen: none



Clinical Indicators: 

Initial Vital signs/Pulse oximetry: 111/60, 80, 22, 97.5, 87% RA

Lung/Breathing assessment:  "diminished @ bases, few scattered rhonchi, severe 
shortness of breath"

Treatment: Duonebs, Heparin bolus/gtt

O2:  Oxygen 2L-15L NRB (4/19)

Spo2 readings: 89-92% 4L, RR: 22-24, 92% 15L NRB

4/20: current Spo2: 91% 4L



In your professional opinion, can you please clarify if these findings signify 
one of the following conditions?  



   Acuity:

        o   Acute

        o   Chronic

        o   Acute on Chronic



   Respiratory Status:

        o   Respiratory failure

        o   Respiratory failure with hypoxia

        o   Acute Respiratory Distress

        o   Other Diagnosis, please specify

        o   Unable to determine



Please document in your progress notes and discharge summary in order to 
capture severity of illness and risk of mortality. Include clinical findings 
that support your diagnosis.



 FYI: Press F11 to launch patient chart.



_____ Place X here if this finding has no clinical significance, is not 
applicable or if you are not able to provide any additional documentation.

MTDD

## 2017-04-20 NOTE — P.PN
Subjective


Principal diagnosis: 





PE





This is a 45-year-old female with history of diabetes and obesity who was seen 

by Dr. Liao for evaluation of symptoms of shortness of breath about 2 weeks 

ago.  Patient had an echocardiogram which showed normal LV function at the time 

and also normal right ventricular size.  Over the last 3-4 days, patient has 

been experiencing increasing shortness of breath and orthopnea.  Patient came 

to the emergency room with those symptoms.  Chest x-ray did not reveal any 

findings of CHF.  Blood test showed evidence of elevated d-dimer.  Computed 

tomography scan showed evidence of multiple pulmonary emboli.  Her troponins 

are also mildly elevated.  BNP is also elevated.  EKG showed sinus rhythm with 

a diffuse ST-T wave normalities in anterolateral leads suggestive of ischemia.  

Echocardiogram with Doppler study revealed normal left ventricular systolic 

function.  Venous duplex study negative for DVT.  Blood pressure 92/50 with a 

heart rate in the 60s.   continues to be on IV heparin.  We will check to 

see if patient has coverage for one of the newer anticoagulants, if so we will 

initiate this.  Overall the patient is feeling much better today.





Objective





- Vital Signs


Vital signs: 


 Vital Signs











Temp  97.8 F   04/20/17 15:45


 


Pulse  76   04/20/17 15:45


 


Resp  18   04/20/17 15:45


 


BP  92/54   04/20/17 15:45


 


Pulse Ox  91 L  04/20/17 15:45








 Intake & Output











 04/19/17 04/20/17 04/20/17





 18:59 06:59 18:59


 


Intake Total 7556.931 9968 109.893


 


Output Total  350 


 


Balance 9048.062 5039 109.893


 


Weight  220.1 kg 220.1 kg


 


Intake:   


 


  IV  1040 


 


    Heparin Sodium,Porcine/  720 





    D5w Pmx 25,000 unit In   





    Dextrose/Water 1 500ml.   





    bag @ 10.5 UNITS/KG/HR 45   





    .91 mls/hr IV .[id number]   





    [facility] Rx#:[id number]   


 


    Sodium Chloride 0.9% 1,  320 





    000 ml @ 20 mls/hr IV .   





    Q24H [facility] Rx#:[id number]   


 


  Intake, IV Titration 1060.000 500 109.893





  Amount   


 


    Heparin Sodium,Porcine/ 500.000 500 109.893





    D5w Pmx 25,000 unit In   





    Dextrose/Water 1 500ml.   





    bag @ 10.5 UNITS/KG/HR 45   





    .91 mls/hr IV .[id number]   





    [facility] Rx#:[id number]   


 


    Heparin Sodium,Porcine/ 400  





    D5w Pmx 25,000 unit In   





    Dextrose/Water 1 500ml.   





    bag @ 12 UNITS/KG/HR 52.   





    47 mls/hr IV .[id number] [facility]   





    Rx#:[id number]   


 


    Sodium Chloride 0.9% 1, 160  





    000 ml @ 20 mls/hr IV .   





    Q24H [facility] Rx#:[id number]   


 


Output:   


 


  Urine  350 


 


Other:   


 


  Voiding Method  Bedside Commode Bedside Commode


 


  # Voids 0  1


 


  # Bowel Movements 0  














- Exam





PHYSICAL EXAMINATION: 





HEENT: Head is atraumatic, normocephalic.  Pupils equal, round.  Neck is 

supple.  There is no elevated jugular venous pressure.





HEART EXAMINATION: Heart S1, S2 normal.  No murmur or gallop heard.





CHEST EXAMINATION: Lungs reveal diminished air entry bilaterally with fine 

coarse wheezing.





ABDOMEN:  Soft, nontender. Bowel sounds are heard. No organomegaly noted.


 


EXTREMITIES: 2+ peripheral pulses with no evidence of peripheral edema and no 

calf tenderness noted.





NEUROLOGIC patient is awake, alert and oriented -3.


 


.


 








- Labs


CBC & Chem 7: 


 04/20/17 05:42





 04/20/17 05:42


Labs: 


 Abnormal Lab Results - Last 24 Hours (Table)











  04/19/17 04/19/17 04/19/17 Range/Units





  06:52 16:59 20:58 


 


APTT     (22.0-30.0)  sec


 


Sodium     (137-145)  mmol/L


 


BUN     (7-17)  mg/dL


 


Glucose     (74-99)  mg/dL


 


POC Glucose (mg/dL)   128 H  146 H  (75-99)  mg/dL


 


Hemoglobin A1c  7.5 H    (4.2-6.1)  %














  04/20/17 04/20/17 04/20/17 Range/Units





  05:42 05:42 05:50 


 


APTT  74.0 H    (22.0-30.0)  sec


 


Sodium   136 L   (137-145)  mmol/L


 


BUN   20 H   (7-17)  mg/dL


 


Glucose   167 H   (74-99)  mg/dL


 


POC Glucose (mg/dL)    164 H  (75-99)  mg/dL


 


Hemoglobin A1c     (4.2-6.1)  %














  04/20/17 Range/Units





  11:56 


 


APTT   (22.0-30.0)  sec


 


Sodium   (137-145)  mmol/L


 


BUN   (7-17)  mg/dL


 


Glucose   (74-99)  mg/dL


 


POC Glucose (mg/dL)  241 H  (75-99)  mg/dL


 


Hemoglobin A1c   (4.2-6.1)  %














Assessment and Plan


(1) Abnormal EKG


Status: Acute   





(2) COPD (chronic obstructive pulmonary disease)


Status: Acute   





(3) Diabetes mellitus


Status: Acute   





(4) Hypertension


Status: Acute   





(5) Hypoxia


Status: Acute   





(6) Pulmonary embolism


Status: Acute   


Plan: 


From cardiology's perspective, we will recommend to continue current 

medications.  LV function by echo is normal.  Continue IV heparin drip and 

check on one of the newer anticoagulants.  Once the patient is stable from a 

pulmonary embolism perspective, as an outpatient, consider stress test.








DNP note has been reviewed, I agree with a documented findings and plan of 

care.  Patient was seen and examined.

## 2017-04-20 NOTE — ECHOF
Referral Reason:Chest pain and cardiomyopathy



MEASUREMENTS

--------

HEIGHT: 170.2 cm

WEIGHT: 217.7 kg

BP: 116/68

RVIDd:   4.1 cm     (< 3.3)

IVSd:   1.6 cm     (0.6 - 1.1)

LVIDd:   3.5 cm     (3.9 - 5.3)

LVPWd:   1.7 cm     (0.6 - 1.1)

IVSs:   2.1 cm

LVIDs:   2.5 cm

LVPWs:   1.8 cm

LA Diam:   3.5 cm     (2.7 - 3.8)

Ao Diam:   3.8 cm     (2.0 - 3.7)

AV Cusp:   2.5 cm     (1.5 - 2.6)

LA Diam:   3.3 cm     (2.7 - 3.8)

MV EXCURSION:   10.412 mm     (> 18.000)

MV EF SLOPE:   53 mm/s     (70 - 150)

EPSS:   1.0 cm

MV E García:   0.57 m/s

MV DecT:   301 ms

MV A García:   0.57 m/s

MV E/A Ratio:   0.99 

RAP:   5.00 mmHg

RVSP:   41.26 mmHg







FINDINGS

--------

Sinus rhythm.

This was a technically difficult study with suboptimal 

views.   Morbid Obesity

There is moderate concentric left ventricular 

hypertrophy.   Overall left ventricular systolic 

function is normal with, an EF between 55 - 60 %.

The right ventricle is severely enlarged.   The right 

ventricular septal wall is flattened in diastole and 

systole which is consistent  with right ventricular 

volume and pressure overload.

The left atrial size is normal.

The right atrium was not well visualized.

1.5mg of Definity was utilized for enhancement of images

The aortic valve was not well visualized.

Mild mitral annular calcification present.   Mild 

mitral regurgitation is present.

Mild tricuspid regurgitation present.   There is mild 

pulmonary hypertension.   The right ventricular 

systolic pressure, as measured by Doppler, is 41.26mmHg.

The pulmonic valve was not well visualized.

The aortic root size is normal.

IVC Not well visulized.

Echo free space may represent effusion or a pericardial 

fat pad.



CONCLUSIONS

--------

1. Sinus rhythm.

2. 1.5mg of Definity was utilized for enhancement of images

3. The aortic valve was not well visualized.

4. Mild mitral annular calcification present.

5. Mild mitral regurgitation is present.

6. Mild tricuspid regurgitation present.

7. There is mild pulmonary hypertension.

8. The right ventricular systolic pressure, as measured by 

Doppler, is 41.26mmHg.

9. The pulmonic valve was not well visualized.

10. The aortic root size is normal.

11. IVC Not well visulized.

12. This was a technically difficult study with suboptimal 

views.

13. Echo free space may represent effusion or a pericardial 

fat pad.

14. Morbid Obesity

15. There is moderate concentric left ventricular 

hypertrophy.

16. Overall left ventricular systolic function is normal 

with, an EF between 55 - 60 %.

17. The right ventricle is severely enlarged.

18. The right ventricular septal wall is flattened in 

diastole and systole which is consistent  with right 

ventricular volume and pressure overload.

19. The left atrial size is normal.

20. The right atrium was not well visualized.





SONOGRAPHER: Benjamin Rick RDCS

## 2017-04-20 NOTE — P.PN
Subjective





Progress note dated 04/20/2017





45-year-old obese female with history of hypertension and diabetes who 

presented with 3 weeks of progressive and increasing shortness of breath.  She 

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  Anyway she 

was found to have a pulmonary embolism in the right upper lobe pulmonary 

artery.  She also had disease in the right lower lobe pulmonary artery.  She 

was placed on IV heparin.  Currently running her insurance to see whether or 

not she was a candidate for a factor X a inhibitor.  Doing relatively well 

otherwise.  Currently on IV heparin.  Feeling well.  Would like to be able to 

be discharged soon hopefully.  


Hopefully, she does qualify factor X a inhibitor.  Likewise if she does not, 

she may have to be here a bit longer.  Alternatively, she can begin 

anticoagulated with a low molecular weight heparin like Lovenox a milligram per 

kilogram body weight twice a day subcu until her Coumadin is therapeutic.





Objective





- Vital Signs


Vital signs: 


 Vital Signs











Temp  98.3 F   04/20/17 12:06


 


Pulse  85   04/20/17 13:06


 


Resp  20   04/20/17 12:06


 


BP  93/50   04/20/17 12:06


 


Pulse Ox  92 L  04/20/17 12:00








 Intake & Output











 04/19/17 04/20/17 04/20/17





 18:59 06:59 18:59


 


Intake Total 6103.011 2214 109.893


 


Output Total  350 


 


Balance 0718.887 6276 109.893


 


Weight  220.1 kg 220.1 kg


 


Intake:   


 


  IV  1040 


 


    Heparin Sodium,Porcine/  720 





    D5w Pmx 25,000 unit In   





    Dextrose/Water 1 500ml.   





    bag @ 10.5 UNITS/KG/HR 45   





    .91 mls/hr IV .[id number]   





    [facility] Rx#:[id number]   


 


    Sodium Chloride 0.9% 1,  320 





    000 ml @ 20 mls/hr IV .   





    Q24H [facility] Rx#:[id number]   


 


  Intake, IV Titration 1060.000 500 109.893





  Amount   


 


    Heparin Sodium,Porcine/ 500.000 500 109.893





    D5w Pmx 25,000 unit In   





    Dextrose/Water 1 500ml.   





    bag @ 10.5 UNITS/KG/HR 45   





    .91 mls/hr IV .[id number]   





    [facility] Rx#:[id number]   


 


    Heparin Sodium,Porcine/ 400  





    D5w Pmx 25,000 unit In   





    Dextrose/Water 1 500ml.   





    bag @ 12 UNITS/KG/HR 52.   





    47 mls/hr IV .Q9H32M [facility]   





    Rx#:[id number]   


 


    Sodium Chloride 0.9% 1, 160  





    000 ml @ 20 mls/hr IV .   





    Q24H [facility] Rx#:[id number]   


 


Output:   


 


  Urine  350 


 


Other:   


 


  Voiding Method  Bedside Commode Bedside Commode


 


  # Voids 0  1


 


  # Bowel Movements 0  














- Exam





No acute distress, oriented 3.  Laying on her left side.  No respiratory 

distress.





HEENT examination is grossly unremarkable.  Mucous membranes are moist.





Neck supple.  Full range of motion.  No adenopathy.





Cardiovascular examination reveals distant heart sounds.  S1-S2 normal.





Lungs reveal relatively clear breath sounds.  No wheezes or rhonchi.  No 

crackles.





Abdomen soft bowel sounds obese.





Next semis are intact.  No edema.





Skin without rash.





Brief neurologic examination is nonfocal.





- Labs


CBC & Chem 7: 


 04/20/17 05:42





 04/20/17 05:42


Labs: 


 Abnormal Lab Results - Last 24 Hours (Table)











  04/19/17 04/19/17 04/19/17 Range/Units





  06:52 16:59 20:58 


 


APTT     (22.0-30.0)  sec


 


Sodium     (137-145)  mmol/L


 


BUN     (7-17)  mg/dL


 


Glucose     (74-99)  mg/dL


 


POC Glucose (mg/dL)   128 H  146 H  (75-99)  mg/dL


 


Hemoglobin A1c  7.5 H    (4.2-6.1)  %














  04/20/17 04/20/17 04/20/17 Range/Units





  05:42 05:42 05:50 


 


APTT  74.0 H    (22.0-30.0)  sec


 


Sodium   136 L   (137-145)  mmol/L


 


BUN   20 H   (7-17)  mg/dL


 


Glucose   167 H   (74-99)  mg/dL


 


POC Glucose (mg/dL)    164 H  (75-99)  mg/dL


 


Hemoglobin A1c     (4.2-6.1)  %














  04/20/17 Range/Units





  11:56 


 


APTT   (22.0-30.0)  sec


 


Sodium   (137-145)  mmol/L


 


BUN   (7-17)  mg/dL


 


Glucose   (74-99)  mg/dL


 


POC Glucose (mg/dL)  241 H  (75-99)  mg/dL


 


Hemoglobin A1c   (4.2-6.1)  %














Assessment and Plan


(1) COPD (chronic obstructive pulmonary disease)


Status: Acute   





(2) Diabetes mellitus


Status: Acute   





(3) Hypertension


Status: Acute   





(4) Hypoxia


Status: Acute   





(5) Obesity


Status: Acute   





(6) Pulmonary embolism


Status: Acute   


Plan: 





Plan dated 04/19/2017





The patient should be treated for 6 months with blood thinners.  Hopefully she'

ll qualify for one of the factor X a inhibitors.  Currently she is on IV 

heparin.  She needs a repeat computed tomography scan in about 8-10 weeks.  I 

asked her to come see me in the office.  No we'll clear-cut definable risk 

factor other than the fact that she is somewhat sedentary although she 

continues to work.  The patient is a smoker though.  Does not take any estrogen 

replacement therapy.  Has not been on any long car train boat or plane rides.  

No trauma to the lower extremities.  Doppler of the lower semis were negative.  

In the office, she'll have complete PFTs.  Again we'll repeat a computed 

tomography scan in about 8-10 weeks.





Plan dated 04/20/2017





As mentioned above, this patient should be treated with blood thinners for at 

least 6 months.  Whether his Coumadin or something else such as a factor Xa 

inhibitor.  We believe it 6 months as the appropriate length of time.  We also 

think that she should have a repeat computed tomography scan in about 8-10 

weeks.  Anyway, the patient is doing well.  No major issues today.  Feeling 

well.  Again hoping to be able be discharged soon from the hospital.  We'll 

continue to follow.  I asked her to see me in the office in a couple weeks.  

Again a follow-up computed tomography scan in 8-10 weeks.  6 months with blood 

thinner should be appropriate.  Additional recommendations are made.  We'll 

continue to follow.


Time with Patient: Less than 30

## 2017-04-21 NOTE — PN
DATE OF SERVICE:  04/20/2017



This is a 45-year-old woman who was admitted with shortness of breath, 

pulmonary embolism with heparin.  Patient improved significantly.  

Seen and evaluated the patient with the nurse practitioner.  Please 

refer to the nurse practitioner's notes for impression document as 

ascribed further information.  



REVIEW OF SYSTEMS:

CARDIOVASCULAR:  No nodule or palpitation.

RESPIRATORY:  As mentioned earlier. 

GI: No nausea. 

: No dysuria. 

NERVOUS SYSTEM: No numbness or weakness.



Current medications are DuoNeb q.i.d. and p.r.n., Xanax 0.5 b.i.d., 

aspirin 325 mg, Tenormin 25 mg, Hygroton 25 mg, vitamin D2 fifty 

thousand units daily, Glucotrol 10 mg b.i.d. heparin subQ IV,  

Humalog a.c. and at bedtime, Synthroid 75 mcg p.o., Zestril 5 mg p.o. 

daily, Niacin TR 1000 mg daily, Nitrostat 0.4 sublingual p.r.n., 

Protonix 40 mg daily. 



RECOMMENDATION:  Recommend to continue with the heparin. Otherwise, no 

anticoagulation agents for at least 6 months. Dr. Levy's input 

appreciated.  Further recommendations to follow.  Prognosis guarded.

## 2017-04-21 NOTE — P.PN
Subjective





Progress note dated 04/20/2017





45-year-old obese female with history of hypertension and diabetes who 

presented with 3 weeks of progressive and increasing shortness of breath.  She 

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  Anyway she 

was found to have a pulmonary embolism in the right upper lobe pulmonary 

artery.  She also had disease in the right lower lobe pulmonary artery.  She 

was placed on IV heparin.  Currently running her insurance to see whether or 

not she was a candidate for a factor X a inhibitor.  Doing relatively well 

otherwise.  Currently on IV heparin.  Feeling well.  Would like to be able to 

be discharged soon hopefully.  


Hopefully, she does qualify factor X a inhibitor.  Likewise if she does not, 

she may have to be here a bit longer.  Alternatively, she can begin 

anticoagulated with a low molecular weight heparin like Lovenox a milligram per 

kilogram body weight twice a day subcu until her Coumadin is therapeutic.





Progress note dated 04/21/2017





45-year-old obese female with a history of hypertension diabetes.  Presented 

with complaints of shortness of breath.  The shortness of breath or been 

present for 3 weeks and progressive.  She works as a .  Anyway she 

was discovered to have a pulmonary embolism primarily in the right upper lobe.  

Also a clot in the right lower lobe.  She was placed on IV heparin.  She 

apparently didn't qualify for factor X a inhibitor.  From our perspective she 

could be discharged home.  I did mention to her that she should be treated for 

6 months.  I think this is unprovoked clot even though she was somewhat 

sedentary.  In addition a couple other things need to happen.  She needs a 

follow with me in the office in about 3-4 weeks.  I told she would need a follow

-up computed tomography scan of the chest in about 8-10 or 12 weeks.  Again I 

like to see in the office.  Finally, she has a low saturation by pulse oximetry 

and may need to go home on oxygen therapy.  Hopefully this is only transient.





Objective





- Vital Signs


Vital signs: 


 Vital Signs











Temp  98.6 F   04/21/17 07:00


 


Pulse  96   04/21/17 08:00


 


Resp  16   04/21/17 09:05


 


BP  90/75   04/21/17 07:00


 


Pulse Ox  91 L  04/21/17 09:05








 Intake & Output











 04/20/17 04/21/17 04/21/17





 18:59 06:59 18:59


 


Intake Total 645.759 4068.12 904.84


 


Balance 488.210 5386.12 904.84


 


Weight 220.1 kg  


 


Intake:   


 


  Intake, IV Titration 609.893 431.12 664.84





  Amount   


 


    Heparin Sodium,Porcine/ 609.893 431.12 664.84





    D5w Pmx 25,000 unit In   





    Dextrose/Water 1 500ml.   





    bag @ 10.5 UNITS/KG/HR 45   





    .91 mls/hr IV .[id number]   





    [facility] Rx#:[id number]   


 


  Oral  600 240


 


Other:   


 


  Voiding Method Bedside Commode Toilet Toilet


 


  # Voids 1 3 














- Exam





No acute distress, oriented 3.  Laying on her left side.  No respiratory 

distress.





HEENT examination is grossly unremarkable.  Mucous membranes are moist.





Neck supple.  Full range of motion.  No adenopathy.





Cardiovascular examination reveals distant heart sounds.  S1-S2 normal.





Lungs reveal relatively clear breath sounds.  No wheezes or rhonchi.  No 

crackles.





Abdomen soft bowel sounds obese.





Next semis are intact.  No edema.





Skin without rash.





Brief neurologic examination is nonfocal.





- Labs


CBC & Chem 7: 


 04/21/17 08:55





 04/21/17 08:55


Labs: 


 Abnormal Lab Results - Last 24 Hours (Table)











  04/20/17 04/20/17 04/20/17 Range/Units





  05:42 11:56 16:41 


 


Hct     (34.0-46.0)  %


 


APTT     (22.0-30.0)  sec


 


Sodium  136 L    (137-145)  mmol/L


 


Chloride     ()  mmol/L


 


Carbon Dioxide     (22-30)  mmol/L


 


BUN  20 H    (7-17)  mg/dL


 


Glucose  167 H    (74-99)  mg/dL


 


POC Glucose (mg/dL)   241 H  123 H  (75-99)  mg/dL














  04/20/17 04/21/17 04/21/17 Range/Units





  20:46 07:11 08:55 


 


Hct    46.4 H  (34.0-46.0)  %


 


APTT     (22.0-30.0)  sec


 


Sodium     (137-145)  mmol/L


 


Chloride     ()  mmol/L


 


Carbon Dioxide     (22-30)  mmol/L


 


BUN     (7-17)  mg/dL


 


Glucose     (74-99)  mg/dL


 


POC Glucose (mg/dL)  149 H  169 H   (75-99)  mg/dL














  04/21/17 04/21/17 Range/Units





  08:55 09:01 


 


Hct    (34.0-46.0)  %


 


APTT   78.5 H  (22.0-30.0)  sec


 


Sodium  136 L   (137-145)  mmol/L


 


Chloride  95 L   ()  mmol/L


 


Carbon Dioxide  33 H   (22-30)  mmol/L


 


BUN    (7-17)  mg/dL


 


Glucose  188 H   (74-99)  mg/dL


 


POC Glucose (mg/dL)    (75-99)  mg/dL














Assessment and Plan


(1) COPD (chronic obstructive pulmonary disease)


Status: Acute   





(2) Diabetes mellitus


Status: Acute   





(3) Hypertension


Status: Acute   





(4) Hypoxia


Status: Acute   





(5) Obesity


Status: Acute   





(6) Pulmonary embolism


Status: Acute   


Plan: 





Plan dated 04/19/2017





The patient should be treated for 6 months with blood thinners.  Hopefully she'

ll qualify for one of the factor X a inhibitors.  Currently she is on IV 

heparin.  She needs a repeat computed tomography scan in about 8-10 weeks.  I 

asked her to come see me in the office.  No we'll clear-cut definable risk 

factor other than the fact that she is somewhat sedentary although she 

continues to work.  The patient is a smoker though.  Does not take any estrogen 

replacement therapy.  Has not been on any long car train boat or plane rides.  

No trauma to the lower extremities.  Doppler of the lower semis were negative.  

In the office, she'll have complete PFTs.  Again we'll repeat a computed 

tomography scan in about 8-10 weeks.





Plan dated 04/20/2017





As mentioned above, this patient should be treated with blood thinners for at 

least 6 months.  Whether his Coumadin or something else such as a factor Xa 

inhibitor.  We believe it 6 months as the appropriate length of time.  We also 

think that she should have a repeat computed tomography scan in about 8-10 

weeks.  Anyway, the patient is doing well.  No major issues today.  Feeling 

well.  Again hoping to be able be discharged soon from the hospital.  We'll 

continue to follow.  I asked her to see me in the office in a couple weeks.  

Again a follow-up computed tomography scan in 8-10 weeks.  6 months with blood 

thinner should be appropriate.  Additional recommendations are made.  We'll 

continue to follow.





Plan dated 04/21/2017





The patient could be considered for discharge.  I did tell her that she did 6 

months of blood thinners.  She apparently qualify for one of the factor X a 

inhibitors.  I did say she needed to follow with me.  She may need to be 

discharged home on oxygen therapy.  I will be okay.  In addition some more down 

the road about 8-12 weeks, she'll need a follow-up CT angiogram of chest.  

Additional recommendations suggestions are forthcoming.  We will continue to 

follow she remains in the hospital.


Time with Patient: Less than 30

## 2017-04-21 NOTE — DS
DATE OF ADMISSION:   04/18/2017

DATE OF DISCHARGE:   04/21/2017





FINAL DIAGNOSES:

1. Shortness of breath with acute bilateral pulmonary embolism. 

2. Acute hypoxic respiratory failure secondary to pulmonary embolism. 

3. Troponin 0.06, indeterminate rule out coronary artery disease  

possibly related to pulmonary embolism. 

4. Status post IV heparin monitoring. 

5. Leukocytosis possibly reactive.

6. Ongoing nicotine dependence. 

7. Chronic obstructive pulmonary disease, acute exacerbation. 

8. Diabetes mellitus type 2. 

9. Hypothyroidism. 

10. Elevated AST. 

11. Super morbid obesity, BMI 76. 

12. History of hypertension.

13. History of depression, not otherwise specified. 

14. FULL CODE. 



DISCHARGE DISPOSITION: The patient will be discharged in stable 

condition with guarded prognosis.  Total time taken 35 minutes.  Dr. Levy cleared the patient for discharge.  



HISTORY OF PRESENT ILLNESS: This 45-year-old woman with a past medical 

history of multiple medical problems was admitted with features of 

shortness of breath and bilateral pulmonary embolism was noted. The 

patient was being followed by Dr. Mcdermott in the outpatient setting. 

The patient treated with IV heparin.  The patient improved 

significantly. 



Otherwise,  on exam, vital signs stable.  CARDIOVASCULAR: S1, S2 

muffled.  RESPIRATORY: A few rhonchi.  ABDOMEN: Soft. Nervous system: 

No focal deficits. 



LABORATORY DATA: Sodium is 136 and potassium 4.3.  



The patient will be discharged in a stable condition with guarded 

prognosis. 



DISCHARGE ADVICE AND MEDICATIONS: 

1. Diet is cardiac.

2. Follow-up with Dr. Mcdermott in 2 to 3 days. 

3. Follow up with Dr. Levy as recommend.

4. Follow up with Dr. Dasilva as recommend in 2 weeks.  Medications As 

follows:  

5. Xanax 0.5 p.o. b.i.d. p.r.n. 

6. Albuterol HFA 2 puffs q.i.d. and p.r.n. 

7. Atenolol hydrochlorthalidone 1  tablets p.o. daily. 

8. Vitamin D2 50,000 p.o. Tuesday. 

9. Fish oil 2 p.o. daily. 

10. Synthroid 75 mg p.o. q.h.s. 

11. Zestril 5 mg p.o. daily. 

12. Multivitamin 1 p.o. daily. 

13. Niacin 1200 milligrams p.o. daily. 

14. Nystatin 5 mL p.o. q.i.d. 

15. Protonix 40 mg a.c. daily. 

16. Xarelto starter pack 15 mg p.o. b.i.d. for 3 weeks and then 20 mg 

p.o. daily.  

17. Glipizide 10 mg p.o. b.i.d. 







Once again the patient will be discharged in stable condition with 

guarded prognosis.

## 2017-05-21 NOTE — ED
Extremity Problem HPI





- General


Chief complaint: Extremity Problem,Nontraumatic


Stated complaint: leg swelling


Time Seen by Provider: 17 12:14


Source: patient, RN notes reviewed


Mode of arrival: wheelchair


Limitations: no limitations





- History of Present Illness


Initial comments: 





45-year-old female presents to the emergency Department chief complaint of 

bilateral lower extremity swelling.  Patient states she's been about a week and 

a half now she's noticed swelling to her bilateral lower extremities.  Patient 

states she's been about 16 pounds awake.  Patient states she's been to her 

doctor as well as to a cardiologist for this.  Patient states she is on Lasix 

however it does not seem to be improving.  Patient states she is currently 

Please that she got dropped off for she does have chronic shortness of breath 

but does seem to be improved from when she was diagnosed with PACs.  Patient 

states she is concerned due to her continued symptoms so she thought that she 

should be evaluated.





- Related Data


 Home Medications











 Medication  Instructions  Recorded  Confirmed


 


ALPRAZolam [Xanax] 0.5 mg PO BID PRN 17


 


Ergocalciferol [Vitamin D2 50,000 unit PO TU 17





(DRISDOL)]   


 


Fish Oil/Dha/Epa [Fish Oil 1,200 2 cap PO DAILY 17





mg Fish Oil]   


 


Levothyroxine Sodium [Synthroid] 75 mcg PO HS 17


 


Lisinopril [Zestril] 5 mg PO DAILY 17


 


Niacin (Inositol Niacinate) 1,200 mg PO DAILY 17





[Niacin Flush Free 500 mg Cap]   


 


glipiZIDE [Glucotrol XL] 10 mg PO BID 17


 


Albuterol Inhaler [Ventolin Hfa 2 puff INHALATION RT-TID 17





Inhaler]   


 


Furosemide [Lasix] 40 mg PO DAILY 17


 


HYDROcodone/APAP 10-325MG [Norco 1 tab PO DAILY PRN 17





]   


 


Potassium Chloride [Klor-Con 10] 10 meq PO DAILY 17


 


Rivaroxaban [Xarelto] 20 mg PO DAILY 17








 Previous Rx's











 Medication  Instructions  Recorded


 


Multivitamins, Thera [Multivitamin 1 tab PO DAILY #30 tablet 17





(formulary)]  











 Allergies











Allergy/AdvReac Type Severity Reaction Status Date / Time


 


Statins-Hmg-Coa Reductase AdvReac Severe Muscle Verified 17 13:42





Inhibitor   Cramps  














Review of Systems


ROS Statement: 


Those systems with pertinent positive or pertinent negative responses have been 

documented in the HPI.





ROS Other: All systems not noted in ROS Statement are negative.





Past Medical History


Past Medical History: Diabetes Mellitus, Hypertension, Pulmonary Embolus (PE), 

Thyroid Disorder


History of Any Multi-Drug Resistant Organisms: None Reported


Past Surgical History:  Section


Additional Past Surgical History / Comment(s): right ovary removed


Past Anesthesia/Blood Transfusion Reactions: No Reported Reaction


Past Psychological History: No Psychological Hx Reported


Smoking Status: Current every day smoker


Past Alcohol Use History: None Reported


Past Drug Use History: None Reported





- Past Family History


  ** Father


Family Medical History: No Reported History





  ** Mother


Family Medical History: No Reported History





General Exam





- General Exam Comments


Initial Comments: 





General:  The patient is awake and alert, in no distress, and does not appear 

acutely ill. 


Eye:  Pupils are equal, round.


Ears, nose, mouth and throat:  There are moist mucous membranes.


Neck:  The neck is supple, there is no tenderness.  


Cardiovascular:  There is a regular rate and rhythm. No murmur, rub or gallop 

is appreciated.


Respiratory:  Lungs are clear to auscultation, respirations are non-labored, 

breath sounds are equal.  No wheezes, stridor, rales, or rhonchi.


Back:  There is no tenderness to palpation in the midline. There is no obvious 

deformity. No rashes noted. 


Musculoskeletal:  Normal ROM, no tenderness, bilateral calf tenderness and 

swelling.  Sensation intact. Pulses equal bilaterally 2+.  


Neurological:  CN II-XII intact, There are no obvious motor or sensory 

deficits. Coordination appears grossly intact. Speech is normal.


Skin:  Skin is warm and dry and no rashes or lesions are noted. 


Psychiatric:  Cooperative, appropriate mood & affect, normal judgment.  





Limitations: no limitations





Course


 Vital Signs











  17





  12:07


 


Temperature 99.2 F


 


Pulse Rate 96


 


Respiratory 20





Rate 


 


Blood Pressure 124/69


 


O2 Sat by Pulse 95





Oximetry 














Medical Decision Making





- Medical Decision Making





45-year-old female presents for bilateral lower extremity swelling.  At this 

time the patient's workup does not appear disoriented acute findings for the 

swelling.  This time we discussed use


Attentive toes.  We discussed she can increase her Lasix we discussed follow-up 

with her DrManuel johnson parameters.  Patient stated that she understood all 

questions have been answered.  She will be discharged home.





- Lab Data


Result diagrams: 


 17 13:10





 17 13:10


 Lab Results











  17 Range/Units





  13:10 13:10 13:10 


 


WBC   6.8   (3.8-10.6)  k/uL


 


RBC   4.62   (3.80-5.40)  m/uL


 


Hgb   14.5   (11.4-16.0)  gm/dL


 


Hct   44.6   (34.0-46.0)  %


 


MCV   96.5   (80.0-100.0)  fL


 


MCH   31.3   (25.0-35.0)  pg


 


MCHC   32.4   (31.0-37.0)  g/dL


 


RDW   14.6   (11.5-15.5)  %


 


Plt Count   261   (150-450)  k/uL


 


Neutrophils %   64   %


 


Lymphocytes %   25   %


 


Monocytes %   4   %


 


Eosinophils %   3   %


 


Basophils %   1   %


 


Neutrophils #   4.4   (1.3-7.7)  k/uL


 


Lymphocytes #   1.7   (1.0-4.8)  k/uL


 


Monocytes #   0.3   (0-1.0)  k/uL


 


Eosinophils #   0.2   (0-0.7)  k/uL


 


Basophils #   0.1   (0-0.2)  k/uL


 


PT     (9.0-12.0)  sec


 


INR     (<1.1)  


 


APTT     (22.0-30.0)  sec


 


Sodium    138  (137-145)  mmol/L


 


Potassium    4.7  (3.5-5.1)  mmol/L


 


Chloride    103  ()  mmol/L


 


Carbon Dioxide    27  (22-30)  mmol/L


 


Anion Gap    8  mmol/L


 


BUN    16  (7-17)  mg/dL


 


Creatinine    0.68  (0.52-1.04)  mg/dL


 


Est GFR (MDRD) Af Amer    >60  (>60 ml/min/1.73 sqM)  


 


Est GFR (MDRD) Non-Af    >60  (>60 ml/min/1.73 sqM)  


 


Glucose    173 H  (74-99)  mg/dL


 


Calcium    9.0  (8.4-10.2)  mg/dL


 


Magnesium    1.9  (1.6-2.3)  mg/dL


 


Total Bilirubin    0.5  (0.2-1.3)  mg/dL


 


AST    31  (14-36)  U/L


 


ALT    31  (9-52)  U/L


 


Alkaline Phosphatase    37 L  ()  U/L


 


Total Creatine Kinase  44    ()  U/L


 


CK-MB (CK-2)  0.4    (0.0-2.4)  ng/mL


 


CK-MB (CK-2) Rel Index  0.9    


 


Troponin I  <0.012    (0.000-0.034)  ng/mL


 


NT-Pro-B Natriuret Pep     pg/mL


 


Total Protein    6.7  (6.3-8.2)  g/dL


 


Albumin    3.4 L  (3.5-5.0)  g/dL














  17 Range/Units





  13:10 13:10 


 


WBC    (3.8-10.6)  k/uL


 


RBC    (3.80-5.40)  m/uL


 


Hgb    (11.4-16.0)  gm/dL


 


Hct    (34.0-46.0)  %


 


MCV    (80.0-100.0)  fL


 


MCH    (25.0-35.0)  pg


 


MCHC    (31.0-37.0)  g/dL


 


RDW    (11.5-15.5)  %


 


Plt Count    (150-450)  k/uL


 


Neutrophils %    %


 


Lymphocytes %    %


 


Monocytes %    %


 


Eosinophils %    %


 


Basophils %    %


 


Neutrophils #    (1.3-7.7)  k/uL


 


Lymphocytes #    (1.0-4.8)  k/uL


 


Monocytes #    (0-1.0)  k/uL


 


Eosinophils #    (0-0.7)  k/uL


 


Basophils #    (0-0.2)  k/uL


 


PT   11.5  (9.0-12.0)  sec


 


INR   1.2  (<1.1)  


 


APTT   23.1  (22.0-30.0)  sec


 


Sodium    (137-145)  mmol/L


 


Potassium    (3.5-5.1)  mmol/L


 


Chloride    ()  mmol/L


 


Carbon Dioxide    (22-30)  mmol/L


 


Anion Gap    mmol/L


 


BUN    (7-17)  mg/dL


 


Creatinine    (0.52-1.04)  mg/dL


 


Est GFR (MDRD) Af Amer    (>60 ml/min/1.73 sqM)  


 


Est GFR (MDRD) Non-Af    (>60 ml/min/1.73 sqM)  


 


Glucose    (74-99)  mg/dL


 


Calcium    (8.4-10.2)  mg/dL


 


Magnesium    (1.6-2.3)  mg/dL


 


Total Bilirubin    (0.2-1.3)  mg/dL


 


AST    (14-36)  U/L


 


ALT    (9-52)  U/L


 


Alkaline Phosphatase    ()  U/L


 


Total Creatine Kinase    ()  U/L


 


CK-MB (CK-2)    (0.0-2.4)  ng/mL


 


CK-MB (CK-2) Rel Index    


 


Troponin I    (0.000-0.034)  ng/mL


 


NT-Pro-B Natriuret Pep  112   pg/mL


 


Total Protein    (6.3-8.2)  g/dL


 


Albumin    (3.5-5.0)  g/dL














- EKG Data


-: EKG Interpreted by Me





17 14:00


normal sinus rhythm 86 bpm, normal axis, no atopy, no S-T depressions or 

elevations, 





- Radiology Data


Radiology results: report reviewed, image reviewed





Disposition


Clinical Impression: 


 Lower extremity edema





Disposition: HOME SELF-CARE


Condition: Stable


Instructions:  Leg Edema (ED)


Additional Instructions: 


Please use medication as discussed. Please follow up with family doctor if 

symptoms have not improved over the next two days. Please return to the 

emergency room if your symptoms increase or worsen or for any other concerns. 


Referrals: 


Que Mcdermott DO [Primary Care Provider] - 1-2 days


Time of Disposition: 14:33

## 2017-05-21 NOTE — XR
EXAMINATION TYPE: XR chest 2V

 

DATE OF EXAM: 5/21/2017 1:29 PM

 

COMPARISON: 4/18/2017

 

INDICATION: Chest pain and leg swelling

 

TECHNIQUE: Single frontal view of the chest is obtained.

 

FINDINGS:  

The heart size is normal.  

The pulmonary vasculature is normal.  

There may be subtle increased lung markings at the right base. Correlate for atelectasis.  

 

 

IMPRESSION:  

1. Mild subsegmental atelectasis at the right base.

## 2017-05-21 NOTE — US
EXAMINATION TYPE: US venous doppler duplex LE 

 

DATE OF EXAM: 5/21/2017 1:07 PM

 

COMPARISON: NONE

 

CLINICAL HISTORY: Pain.

 

SIDE PERFORMED: Bilateral  

 

TECHNIQUE:  The lower extremity deep venous system is examined utilizing real time linear array sonog
aleyda with graded compression, doppler sonography and color-flow sonography.

 

VESSELS IMAGED:

External Iliac Vein (EIV)

Common Femoral Vein

Deep Femoral Vein

Greater Saphenous Vein *

Femoral Vein  unable to view for compression at distal due to body habitus

Popliteal Vein

Small Saphenous Vein *

Proximal Calf Veins unable to visualized due to body habitus

(* superficial vessels)

 

Technically difficult and somewhat limited study. Patient weight 476lbs.

 

Right Leg:  Appears negative for DVT in this somewhat limited study.

 

Left Leg:  Appears negative for DVT in this somewhat limited study.

 

 

 

IMPRESSION:

1. Bilateral lower extremities negative for deep venous thrombosis.

## 2019-03-26 NOTE — MM
Reason for exam: screening  (asymptomatic).

Last mammogram was performed 2 years and 1 month ago.



History:

Family history of premenopausal breast cancer in maternal aunt at age 34.



Physical Findings:

A clinical breast exam by your physician is recommended on an annual basis and 

results should be correlated with mammographic findings.



MG Screening Mammo w CAD

Bilateral CC, MLO, and XCCL view(s) were taken.

Prior study comparison: February 20, 2017, bilateral MG screening mammo w CAD.  

October 19, 2015, bilateral MG screening mammo w CAD.

There are scattered fibroglandular densities.  No significant changes when 

compared with prior studies.





ASSESSMENT: Benign, BI-RAD 2



RECOMMENDATION:

Routine screening mammogram of both breasts in 1 year.

## 2019-07-24 NOTE — XR
EXAMINATION TYPE: XR chest 2V

 

DATE OF EXAM: 7/23/2019

 

COMPARISON: Prior chest x-ray 5/21/2017

 

HISTORY: Shortness of breath, E 6601, 

 

TECHNIQUE:  Frontal and lateral views of the chest are obtained.

 

FINDINGS:  There is no focal air space opacity, pleural effusion, or pneumothorax seen.  The cardiac 
silhouette size is within normal limits.   The osseous structures are intact. There is overlying daija
fact.

 

IMPRESSION:  No acute cardiopulmonary process.

## 2020-12-04 NOTE — CT
EXAMINATION TYPE: CT abdomen pelvis w con

 

DATE OF EXAM: 12/4/2020

 

COMPARISON: 4/13/2010

 

INDICATION: lower quadrant abdomen swelling

 

DLP: 5300.1 mGycm, Automated exposure control for dose reduction was used.

 

CONTRAST:  100 mL of Isovue 300. 

                        Study performed with Oral Contrast

 

TECHNIQUE: Axial images were obtained from above the diaphragm to the pubic rami in the axial plane a
t 5 mm thick sections.  Reconstructed images are reviewed on the computer in the coronal plane.  

 

FINDINGS:

 

Limited CT sections are obtained the lung bases.  Minimal atelectasis is likely present in the poster
ior lateral left lung base. Some coronary artery calcification is noted..

 

CT ABDOMEN:

 

Liver: Mild fatty infiltration liver may be present.

 

Spleen: Normal

 

Pancreas: Normal

 

Adrenal glands: The adrenal glands are normal.

 

Gallbladder: Normal  

 

Kidneys: No masses are evident. No hydronephrosis is present.   No cysts are present.  Delayed images
 were obtained through the kidneys, which remain unremarkable. No obvious renal stones are evident.

 

Aorta: Vascular calcification is within the aorta. 

 

Inferior vena cava: Normal.

 

CT PELVIS: Very tiny periumbilical hernia containing mesenteric fat is present.

Loops of bowel within the abdomen and pelvis are normal.     There are loops of bowel which are incom
pletely distended or lack oral contrast limiting their evaluation.

 

Appendix: Normal as visualized.

 

Urinary bladder: Normal. 

 

Genitourinary structures: Uterus is normal. Adnexal regions are clear.

 

Osseous structures: No suspicious lytic or sclerotic lesions. Some vacuum disc phenomenon is present 
within the lumbar spine.

 

IMPRESSIONS:

1.  Minimal fatty infiltration liver.

2. Degenerative disc changes lumbar spine.

3. No abnormality to account for lower abdominal swelling

## 2022-02-14 NOTE — MM
Reason for exam: screening  (asymptomatic).

Last mammogram was performed 2 years and 11 months ago.



History:

Family history of premenopausal breast cancer in maternal aunt at age 34.



Physical Findings:

A clinical breast exam by your physician is recommended on an annual basis and 

results should be correlated with mammographic findings.



MG Screening Mammo w CAD

Bilateral CC, MLO, and XCCL view(s) were taken.

Prior study comparison: March 22, 2019, bilateral MG screening mammo w CAD.  

February 20, 2017, bilateral MG screening mammo w CAD.

There are scattered fibroglandular densities.  No significant changes when 

compared with prior studies.





ASSESSMENT: Benign, BI-RAD 2



RECOMMENDATION:

Routine screening mammogram of both breasts in 1 year.